# Patient Record
Sex: FEMALE | URBAN - METROPOLITAN AREA
[De-identification: names, ages, dates, MRNs, and addresses within clinical notes are randomized per-mention and may not be internally consistent; named-entity substitution may affect disease eponyms.]

---

## 2020-11-30 ENCOUNTER — Encounter (OUTPATIENT)
Dept: URBAN - METROPOLITAN AREA EXTERNAL CLINIC 14 | Facility: EXTERNAL CLINIC | Age: 50
End: 2020-11-30

## 2020-11-30 PROCEDURE — 67108 REPAIR DETACHED RETINA: CPT | Performed by: OPHTHALMOLOGY

## 2020-12-01 ENCOUNTER — POST-OPERATIVE VISIT (OUTPATIENT)
Dept: URBAN - METROPOLITAN AREA CLINIC 27 | Facility: CLINIC | Age: 50
End: 2020-12-01

## 2020-12-01 PROCEDURE — 99024 POSTOP FOLLOW-UP VISIT: CPT | Performed by: OPHTHALMOLOGY

## 2020-12-01 ASSESSMENT — INTRAOCULAR PRESSURE
OD: 13
OS: 13

## 2020-12-01 NOTE — IMPRESSION/PLAN
Impression: S/P 23gPPV/EL/SF6 x RD OD OD - 1 Day. Retinal detachment with single break, right eye  H33.011.  Plan: --retina attached under gas
--no s/s infection
--IOP acceptable
--start PF/Oflox QID
--RD/endoph WS discussed
--alt restrictions/positioning discussed
--f/u 1 week SI

## 2020-12-10 ENCOUNTER — POST-OPERATIVE VISIT (OUTPATIENT)
Dept: URBAN - METROPOLITAN AREA CLINIC 27 | Facility: CLINIC | Age: 50
End: 2020-12-10

## 2020-12-10 DIAGNOSIS — H33.011 RETINAL DETACHMENT WITH SINGLE BREAK, RIGHT EYE: Primary | ICD-10-CM

## 2020-12-10 PROCEDURE — 99024 POSTOP FOLLOW-UP VISIT: CPT | Performed by: OPHTHALMOLOGY

## 2020-12-10 PROCEDURE — 92134 CPTRZ OPH DX IMG PST SGM RTA: CPT | Performed by: OPHTHALMOLOGY

## 2020-12-10 ASSESSMENT — INTRAOCULAR PRESSURE
OS: 12
OD: 14

## 2020-12-10 NOTE — IMPRESSION/PLAN
Impression: S/P 23gPPV/EL/SF6 x RD OD OD - 10 Days. Retinal detachment with single break, right eye  H33.011.  Excellent post op course   Post operative instructions reviewed - Condition is improving ---Discontinue Ofloxacin 0.3%--Taper Pred Forte QID x 1 wk, TID x 1 wk, BID x 1wk, QD x 1wk, then d/c Plan: RTC: 3-4 wks POS dilate OU w/ OCT OU

## 2021-01-07 ENCOUNTER — POST-OPERATIVE VISIT (OUTPATIENT)
Dept: URBAN - METROPOLITAN AREA CLINIC 27 | Facility: CLINIC | Age: 51
End: 2021-01-07

## 2021-01-07 PROCEDURE — 99024 POSTOP FOLLOW-UP VISIT: CPT | Performed by: OPHTHALMOLOGY

## 2021-01-07 PROCEDURE — 92134 CPTRZ OPH DX IMG PST SGM RTA: CPT | Performed by: OPHTHALMOLOGY

## 2021-01-07 ASSESSMENT — INTRAOCULAR PRESSURE
OD: 17
OS: 14

## 2021-01-07 NOTE — IMPRESSION/PLAN
Impression: S/P PPV, EL, Gas x RD OD - 38 Days. Retinal detachment with single break, right eye  H33.011. Excellent post op course. Post operative instructions reviewed. Condition is improving. Plan: patient is cleared from a retina standpoint to proceed with new MRx RTC: 6 wks POS with OCT OU

## 2021-02-18 ENCOUNTER — POST-OPERATIVE VISIT (OUTPATIENT)
Dept: URBAN - METROPOLITAN AREA CLINIC 27 | Facility: CLINIC | Age: 51
End: 2021-02-18

## 2021-02-18 PROCEDURE — 99024 POSTOP FOLLOW-UP VISIT: CPT | Performed by: OPHTHALMOLOGY

## 2021-02-18 ASSESSMENT — INTRAOCULAR PRESSURE
OD: 14
OS: 11

## 2021-02-18 NOTE — IMPRESSION/PLAN
Impression: S/P 23gPPV/EL/SF6 OD - 80 Days. Retinal detachment with single break, right eye  H33.011. Excellent post op course. Post operative instructions reviewed. Condition is improving.  Plan: RTC: 12 months with OCT OU